# Patient Record
(demographics unavailable — no encounter records)

---

## 2017-12-13 NOTE — CT
CT BRAIN WITHOUT CONTRAST:

 

Comparison: 7-2-12

 

History: Headache for one week and blurry vision. 

 

Technique: Multiple contiguous axial images were obtained in a CT of the brain without contrast. 

 

FINDINGS: 

There are scattered hypodensities in the subcortical and periventricular white matter, likely seconda
ry to small vessel ischemic disease. A cavum septum pellucidum is incidentally seen. There is no evid
ence of hydrocephalus, intracranial hemorrhage or extraaxial fluid collection. 

 

The calvarium and overlying soft tissues are unremarkable. The visualized paranasal sinuses and masto
id air cells are well aerated. 

 

IMPRESSION: 

Small vessel ischemic disease without acute intracranial abnormality. 

 

POS: [facility]

## 2017-12-13 NOTE — CT
CT ABDOMEN AND PELVIS WITH CONTRAST:

 

Comparison: None. 

 

History: Severe abdominal pain. 

 

Technique: Multiple contiguous axial images were obtained in a CT of the abdomen and pelvis with cont
rast. Coronal reformats were performed. 

 

FINDINGS: 

There is streak artifact limiting evaluation. The liver, gallbladder, adrenal glands, kidneys, spleen
, and pancreas are unremarkable. No free air, free fluid, or stranding changes are seen in the abdome
n or pelvis. 

 

There are few scattered diverticula in the colon. The small bowel is unremarkable. No abdominal or pe
lvic lymphadenopathy are seen. 

 

Degenerative changes are seen in the spine. Visualized inferior thorax and abdominal wall soft tissue
s are unremarkable. 

 

IMPRESSION: 

1. No evidence of acute intraabdominal/pelvic abnormality. 

2. Diverticulosis.

 

POS: [facility]

## 2017-12-13 NOTE — RAD
SINGLE VIEW OF THE CHEST:

 

Date: 12-13-17 

 

Comparison: 7-23-13

 

History: Headache for one week. Blurry vision. 

 

FINDINGS: 

Single view of the chest shows an enlarged cardiomediastinal silhouette. There is no evidence of cons
olidation, mass, or pleural effusions. 

 

IMPRESSION: 

Cardiomegaly without evidence of acute cardiopulmonary disease. 

 

POS: [facility]

## 2017-12-14 NOTE — HP
DATE OF ADMISSION:  12/13/2017

 

ADMITTING PHYSICIAN:  Dr. Javed Major.

 

PRIMARY CARE PHYSICIAN:  Dr. Valladares.

 

CHIEF COMPLAINT:  Lower abdominal pain.

 

HISTORY OF PRESENT ILLNESS:  The patient is a 68-year-old gentleman with history of pulmonary hyperte
nsion, diabetes, obesity, diastolic heart failure.  The patient reports that at about 9:00 a.m. he be
pritesh to experience lower abdominal to suprapubic pain.  He reports that he ate breakfast about 8:00 a.
m. which consist of eggs and sausage and bread product.  At about 9:00 a.m. he began to experience th
e sharp pain in the suprapubic area.  He denies any exacerbating or relieving factors.  At approximat
ely 11:00 a.m. he began to experience headaches.  He reported to the emergency room for further evalu
ation.  He denies fevers, chills, injury trauma, nausea, vomiting, diarrhea.

 

REVIEW OF SYSTEMS:  The following complete review of systems was negative, unless otherwise mentioned
 in the HPI or below:

Constitutional:  Weight loss or gain, sense of well-being, ability to conduct usual activities, exerc
ise tolerance.

Skin/Breast:  Rash, itching, changes in hair growth or loss, nail changes, breast lumps, tenderness, 
swelling, nipple discharge.

Eyes:  Vision, double vision, tearing, blind spots, pain.

ENT/Mouth:  Headaches (location, time of onset, duration, precipitating factors), vertigo, lightheade
dness, injury. Vision, double vision, tearing, blind spots, pain, nose bleeding, colds, obstruction, 
discharge, dental difficulties, gingival bleeding, dentures, neck stiffness, pain, tenderness, masses
 in thyroid or other areas

Cardiovascular:  Precordial pain, substernal distress, palpitations, syncope, dyspnea on exertion, or
thopnea, nocturnal paroxysmal dyspnea, edema, cyanosis, hypertension, heart murmurs, varicosities, ph
lebitis, claudication.

Respiratory:  Pain, shortness of breath, wheezing, stridor, cough, hemoptysis, fever or night sweats

Gastrointestinal:  Poor appetite, dysphagia, indigestion, abdominal pain, heartburn, eructation, naus
ea, vomiting, hematemesis, jaundice, constipation, or diarrhea, abnormal stools (dillan-colored, tarry,
 bloody, greasy, foul smelling), flatulence, hemorrhoids, recent changes in bowel habits.

Genitourinary:  Urgency, frequency, dysuria, nocturia, hematuria, polyuria, oliguria, unusual (or luigi
nge in) color of urine, stones, hesitancy, change in size of stream, dribbling, acute retention or in
continence, libido, potency.

Musculoskeletal:  Pain, swelling, redness or heat of muscles or joints, limitation, of motion, muscul
ar weakness, atrophy, cramps.

Neurologic/Psychiatric:  Convulsions, paralyses, tremor, incoordination, parasthesias, difficulties w
ith memory of speech, sensory or motor disturbances, or muscular coordination (ataxia, tremor), emoti
onal problems, anxiety, depression, previous psychiatric care, unusual perceptions, hallucinations.

Allergy/Immunologic:  Skin rash, anemia, bleeding tendency, polydipsia, polyuria, intolerance to heat
 or cold.

 

PAST MEDICAL HISTORY:  Significant for coronary artery disease, hypertension, pulmonary artery hypert
ension, ischemic brain disease, obesity, diabetes.

 

SURGICAL HISTORY:  Abdominal hernia repair, right shoulder surgery, appendectomy.

 

PSYCHIATRIC HISTORY:  None.

 

SOCIAL HISTORY:  Patient is a former tobacco user.  Denies alcohol or drug use.

 

HOME MEDICATIONS:  Norco 10 mg q.4 as needed, carvedilol 6.25 mg dose unknown, hydralazine 25 mg t.i.
d., Lasix 40 mg once a day.

 

ALLERGIES:  No known drug allergies.

 

PHYSICAL EXAMINATION:

VITAL SIGNS:  Vital statistics shows temperature of 97.7, blood pressure 147/98, pulse 81, respiratio
ns 19, satting 97% on 2 liters.

GENERAL:  He is in no acute distress, cooperative.

HEENT:  Normocephalic, atraumatic.

NECK:  Normal range of motion.  Trachea midline.  No meningeal signs.  No cervical adenopathy.  No te
nderness.

RESPIRATORY:  No rhonchi, no wheezing.  Clear to auscultation.

CARDIOVASCULAR:  Regular rate and rhythm.  Normal S1, S2.

ABDOMEN:  Morbidly obese.  Positive bowel sounds.  Tenderness in left lower quadrant suprapubic area.
  No peritoneal signs, no rebound, no guarding.

EXTREMITIES:  No clubbing, cyanosis.  There is 2-3+ edema, left greater than right.

NEUROLOGIC:  Alert and oriented x3, no focal deficits.

SKIN:  Warm, dry, normal in color, no rash.

 

LABORATORY AND IMAGES:  CT of the abdomen and pelvis showed no evidence of acute intra-abdominal or p
elvic abnormality.  There is diverticulosis present as well as degenerative changes in the spine.  Ur
inalysis yellow cloudy, large amount of leukocytes, positive nitrites, white blood cells greater than
 50, bacteria 4+.  CT of the brain show small vessel ischemic disease without acute intracranial abno
rmality.  Chest x-ray shows cardiomegaly without evidence of acute cardiopulmonary disease.  CBC show
s a white count of 6.3, hemoglobin 15.6, hematocrit 50.2, platelets 145.  No bands.  No left shift.  
Lipase 16, creatinine kinase 399.  CMP shows sodium of 138, potassium 4.6, chloride 104, carbon dioxi
de 28, BUN 18, creatinine 1.05, blood glucose 125, calcium 9.5, albumin 4.3, alkaline phosphatase 71,
 AST 31, ALT 26.  Cardiac enzymes show CK-MB of 7.9, troponin I 0.028, PTT 30.5, INR 1.0.

 

ASSESSMENT AND PLAN:

1.  Urinary tract infection.

2.  Hypertensive urgency.

3.  Pulmonary arterial hypertension.

4.  Diabetes type 2.

 

PLAN:  Patient will be admitted to observation without monitor.  He will be treated with p.o. antibio
tics.  We will also place the patient on his home antihypertensive and diastolic heart failure medica
tions.  We will monitor his blood pressure and make alterations accordingly.  He will be placed on sl
iding scale insulin and Accu-Cheks q.a.c. and at bedtime regimen.  We will also diurese the patient a
nd monitor for signs of hypovolemia.

## 2017-12-14 NOTE — DIS
DATE OF ADMISSION:  12/13/2017

 

DATE OF DISCHARGE:  12/14/2017

 

PRIMARY DISCHARGE DIAGNOSIS:  Accelerated hypertension.

 

SECONDARY DISCHARGE DIAGNOSIS:  Urinary tract infection.

 

HOSPITAL COURSE:  The patient was admitted and treated with home medications.  The patient's blood pr
essure was reduced appropriately.  The patient's symptoms improved including headache.  The patient a
lso was noted to have cystitis with suprapubic pain, the patient's symptoms improved with IV ceftriax
one.  The patient was also started on ciprofloxacin by mouth.  The patient remained afebrile without 
leukocytosis during his hospital stay.  Thus, he was deemed stable for discharge with a 5-day regimen
 of oral antibiotics.  The patient was encouraged to avoid high salt and high fat foods, as well as t
o avoid drinking caffeinated drinks.

 

CONSULTATIONS:  None.

 

PROCEDURES:  None.

 

DISCHARGE DISPOSITION:  To home.

 

DISCHARGE ACTIVITY:  As tolerated.

 

DISCHARGE DIET:  Heart healthy.  No salt added.

 

DISCHARGE MEDICATIONS:  The patient is to resume his home medication per the medication reconciliatio
n list.

 

PHYSICAL EXAMINATION:

GENERAL:  The patient is in no acute distress, nontoxic.

HEAD:  Normocephalic, atraumatic.

EYES:  PERRL.  Extraocular muscles intact.

LUNGS:  Clear to auscultation, no wheezing, no rhonchi.

CARDIAC:  Regular rate and rhythm, no murmurs, regurg, gallops.

ABDOMEN:  Obese, nontender, positive bowel sounds.

EXTREMITIES:  No clubbing, cyanosis.  There is 2-3+ edema bilaterally.

 

FOLLOWUP:  The patient is to follow up with his PCP within 7-10 days.  Instructed to follow up in the
 emergency department for any other untoward events.